# Patient Record
Sex: FEMALE
[De-identification: names, ages, dates, MRNs, and addresses within clinical notes are randomized per-mention and may not be internally consistent; named-entity substitution may affect disease eponyms.]

---

## 2019-06-25 ENCOUNTER — HOSPITAL ENCOUNTER (EMERGENCY)
Dept: HOSPITAL 92 - ERS | Age: 44
Discharge: HOME | End: 2019-06-25
Payer: SELF-PAY

## 2019-06-25 DIAGNOSIS — D25.9: Primary | ICD-10-CM

## 2019-06-25 LAB
ALBUMIN SERPL BCG-MCNC: 4.2 G/DL (ref 3.5–5)
ALP SERPL-CCNC: 141 U/L (ref 40–150)
ALT SERPL W P-5'-P-CCNC: 37 U/L (ref 8–55)
ANION GAP SERPL CALC-SCNC: 10 MMOL/L (ref 10–20)
AST SERPL-CCNC: 32 U/L (ref 5–34)
BASOPHILS # BLD AUTO: 0.1 THOU/UL (ref 0–0.2)
BASOPHILS NFR BLD AUTO: 1.3 % (ref 0–1)
BILIRUB SERPL-MCNC: 0.3 MG/DL (ref 0.2–1.2)
BUN SERPL-MCNC: 17 MG/DL (ref 7–18.7)
CALCIUM SERPL-MCNC: 9.9 MG/DL (ref 7.8–10.44)
CHLORIDE SERPL-SCNC: 105 MMOL/L (ref 98–107)
CO2 SERPL-SCNC: 29 MMOL/L (ref 22–29)
CREAT CL PREDICTED SERPL C-G-VRATE: 0 ML/MIN (ref 70–130)
EOSINOPHIL # BLD AUTO: 0.1 THOU/UL (ref 0–0.7)
EOSINOPHIL NFR BLD AUTO: 1.4 % (ref 0–10)
GLOBULIN SER CALC-MCNC: 2.8 G/DL (ref 2.4–3.5)
GLUCOSE SERPL-MCNC: 80 MG/DL (ref 70–105)
HGB BLD-MCNC: 12.6 G/DL (ref 12–16)
LIPASE SERPL-CCNC: 64 U/L (ref 8–78)
LYMPHOCYTES # BLD: 3 THOU/UL (ref 1.2–3.4)
LYMPHOCYTES NFR BLD AUTO: 36.4 % (ref 21–51)
MCH RBC QN AUTO: 29.1 PG (ref 27–31)
MCV RBC AUTO: 87.9 FL (ref 78–98)
MONOCYTES # BLD AUTO: 0.4 THOU/UL (ref 0.11–0.59)
MONOCYTES NFR BLD AUTO: 5.3 % (ref 0–10)
NEUTROPHILS # BLD AUTO: 4.6 THOU/UL (ref 1.4–6.5)
NEUTROPHILS NFR BLD AUTO: 55.6 % (ref 42–75)
PLATELET # BLD AUTO: 285 THOU/UL (ref 130–400)
POTASSIUM SERPL-SCNC: 3.9 MMOL/L (ref 3.5–5.1)
PREGS CONTROL BACKGROUND?: (no result)
PREGS CONTROL BAR APPEAR?: YES
RBC # BLD AUTO: 4.34 MILL/UL (ref 4.2–5.4)
SODIUM SERPL-SCNC: 140 MMOL/L (ref 136–145)
SP GR UR STRIP: 1.01 (ref 1–1.03)
WBC # BLD AUTO: 8.2 THOU/UL (ref 4.8–10.8)

## 2019-06-25 PROCEDURE — 51701 INSERT BLADDER CATHETER: CPT

## 2019-06-25 PROCEDURE — 86900 BLOOD TYPING SEROLOGIC ABO: CPT

## 2019-06-25 PROCEDURE — 36415 COLL VENOUS BLD VENIPUNCTURE: CPT

## 2019-06-25 PROCEDURE — 81003 URINALYSIS AUTO W/O SCOPE: CPT

## 2019-06-25 PROCEDURE — 80053 COMPREHEN METABOLIC PANEL: CPT

## 2019-06-25 PROCEDURE — 86850 RBC ANTIBODY SCREEN: CPT

## 2019-06-25 PROCEDURE — 84703 CHORIONIC GONADOTROPIN ASSAY: CPT

## 2019-06-25 PROCEDURE — A4353 INTERMITTENT URINARY CATH: HCPCS

## 2019-06-25 PROCEDURE — 76856 US EXAM PELVIC COMPLETE: CPT

## 2019-06-25 PROCEDURE — 83690 ASSAY OF LIPASE: CPT

## 2019-06-25 PROCEDURE — 85025 COMPLETE CBC W/AUTO DIFF WBC: CPT

## 2019-06-25 PROCEDURE — 86901 BLOOD TYPING SEROLOGIC RH(D): CPT

## 2019-06-25 NOTE — ULT
Pelvic sonogram transabdominal and transvaginal imaging.



HISTORY: Pelvic bleeding.



FINDINGS: Urinary bladder has normal appearance. Uterus has a heterogeneous echotexture and measures 
up to 11.2 cm. Partially calcified heterogeneous fibroid in the left side of the uterine myometrium

measures 1.4 cm greatest diameter.



Endometrium measures up to 1.6 cm. No free fluid in the pelvis.



Right ovary is 3.0 cm length with dominant 2.7 cm follicle. Flow not well delineated.



Left ovary not visualized with transabdominal or transvaginal imaging. No adnexal masses are apparent
.







IMPRESSION: Mild fibroid involvement of the enlarged uterus.



Thickened endometrium, 1.6 cm.



Reported By: MICHELL Berry 

Electronically Signed:  6/25/2019 6:47 PM

## 2021-04-16 ENCOUNTER — HOSPITAL ENCOUNTER (EMERGENCY)
Dept: HOSPITAL 18 - NAV ERS | Age: 46
Discharge: HOME | End: 2021-04-16
Payer: SELF-PAY

## 2021-04-16 DIAGNOSIS — Z79.899: ICD-10-CM

## 2021-04-16 DIAGNOSIS — E78.5: ICD-10-CM

## 2021-04-16 DIAGNOSIS — S61.212A: Primary | ICD-10-CM
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
## 2021-04-16 DIAGNOSIS — E78.00: ICD-10-CM

## 2021-04-16 PROCEDURE — 12001 RPR S/N/AX/GEN/TRNK 2.5CM/<: CPT
